# Patient Record
Sex: MALE | Race: WHITE | Employment: UNEMPLOYED | URBAN - METROPOLITAN AREA
[De-identification: names, ages, dates, MRNs, and addresses within clinical notes are randomized per-mention and may not be internally consistent; named-entity substitution may affect disease eponyms.]

---

## 2017-08-11 ENCOUNTER — INITIAL CONSULT (OUTPATIENT)
Dept: NEUROSURGERY | Age: 57
End: 2017-08-11
Payer: COMMERCIAL

## 2017-08-11 VITALS
SYSTOLIC BLOOD PRESSURE: 147 MMHG | HEIGHT: 72 IN | WEIGHT: 183 LBS | DIASTOLIC BLOOD PRESSURE: 86 MMHG | BODY MASS INDEX: 24.79 KG/M2 | HEART RATE: 71 BPM

## 2017-08-11 DIAGNOSIS — G89.29 CHRONIC NECK PAIN: Primary | ICD-10-CM

## 2017-08-11 DIAGNOSIS — G89.29 CHRONIC LOW BACK PAIN, UNSPECIFIED BACK PAIN LATERALITY, WITH SCIATICA PRESENCE UNSPECIFIED: ICD-10-CM

## 2017-08-11 DIAGNOSIS — M54.2 CHRONIC NECK PAIN: Primary | ICD-10-CM

## 2017-08-11 DIAGNOSIS — M54.5 CHRONIC LOW BACK PAIN, UNSPECIFIED BACK PAIN LATERALITY, WITH SCIATICA PRESENCE UNSPECIFIED: ICD-10-CM

## 2017-08-11 PROCEDURE — 99243 OFF/OP CNSLTJ NEW/EST LOW 30: CPT | Performed by: NEUROLOGICAL SURGERY

## 2017-08-25 ENCOUNTER — HOSPITAL ENCOUNTER (OUTPATIENT)
Dept: NUCLEAR MEDICINE | Age: 57
Discharge: HOME | End: 2017-08-25
Payer: COMMERCIAL

## 2017-08-25 ENCOUNTER — HOSPITAL ENCOUNTER (OUTPATIENT)
Dept: MRI IMAGING | Age: 57
Discharge: HOME | End: 2017-08-25
Payer: COMMERCIAL

## 2017-08-25 DIAGNOSIS — G89.29 CHRONIC LOW BACK PAIN, UNSPECIFIED BACK PAIN LATERALITY, WITH SCIATICA PRESENCE UNSPECIFIED: ICD-10-CM

## 2017-08-25 DIAGNOSIS — G89.29 CHRONIC NECK PAIN: ICD-10-CM

## 2017-08-25 DIAGNOSIS — M54.5 CHRONIC LOW BACK PAIN, UNSPECIFIED BACK PAIN LATERALITY, WITH SCIATICA PRESENCE UNSPECIFIED: ICD-10-CM

## 2017-08-25 DIAGNOSIS — M54.2 CHRONIC NECK PAIN: ICD-10-CM

## 2017-08-25 PROCEDURE — 3430000000 HC RX DIAGNOSTIC RADIOPHARMACEUTICAL: Performed by: NEUROLOGICAL SURGERY

## 2017-08-25 PROCEDURE — 72141 MRI NECK SPINE W/O DYE: CPT

## 2017-08-25 PROCEDURE — A9503 TC99M MEDRONATE: HCPCS | Performed by: NEUROLOGICAL SURGERY

## 2017-08-25 PROCEDURE — 78320 NM BONE SCAN SPECT: CPT

## 2017-08-25 RX ORDER — TC 99M MEDRONATE 20 MG/10ML
25 INJECTION, POWDER, LYOPHILIZED, FOR SOLUTION INTRAVENOUS
Status: COMPLETED | OUTPATIENT
Start: 2017-08-25 | End: 2017-08-25

## 2017-08-25 RX ADMIN — Medication 22 MILLICURIE: at 11:25

## 2017-09-05 ENCOUNTER — OFFICE VISIT (OUTPATIENT)
Dept: NEUROSURGERY | Age: 57
End: 2017-09-05
Payer: COMMERCIAL

## 2017-09-05 VITALS — HEART RATE: 64 BPM | HEIGHT: 72 IN | SYSTOLIC BLOOD PRESSURE: 184 MMHG | DIASTOLIC BLOOD PRESSURE: 86 MMHG

## 2017-09-05 DIAGNOSIS — M50.90 CERVICAL DISC DISEASE: Primary | ICD-10-CM

## 2017-09-05 DIAGNOSIS — M51.9 LUMBAR DISC DISEASE: ICD-10-CM

## 2017-09-05 PROCEDURE — 99212 OFFICE O/P EST SF 10 MIN: CPT | Performed by: NEUROLOGICAL SURGERY

## 2017-09-21 ENCOUNTER — TELEPHONE (OUTPATIENT)
Dept: NEUROSURGERY | Age: 57
End: 2017-09-21

## 2017-09-21 DIAGNOSIS — M51.9 LUMBAR DISC DISEASE: Primary | ICD-10-CM

## 2017-10-04 ENCOUNTER — TELEPHONE (OUTPATIENT)
Dept: NEUROSURGERY | Age: 57
End: 2017-10-04

## 2017-10-04 NOTE — TELEPHONE ENCOUNTER
Spoke with patient, another doctor is trying to get everything approved through [facility].

## 2017-10-04 NOTE — TELEPHONE ENCOUNTER
LM for patient, I received a call from [facility] to see if the pt was using his private insurance or [facility].       Yamila Brandon from [facility]

## 2017-12-01 ENCOUNTER — HOSPITAL ENCOUNTER (EMERGENCY)
Facility: CLINIC | Age: 57
Discharge: HOME | End: 2017-12-01
Attending: EMERGENCY MEDICINE
Payer: COMMERCIAL

## 2017-12-01 VITALS
HEART RATE: 62 BPM | TEMPERATURE: 97.9 F | DIASTOLIC BLOOD PRESSURE: 66 MMHG | SYSTOLIC BLOOD PRESSURE: 140 MMHG | RESPIRATION RATE: 13 BRPM | OXYGEN SATURATION: 96 % | BODY MASS INDEX: 25.06 KG/M2 | WEIGHT: 185 LBS | HEIGHT: 72 IN

## 2017-12-01 DIAGNOSIS — J01.91 ACUTE RECURRENT SINUSITIS, UNSPECIFIED LOCATION: Primary | ICD-10-CM

## 2017-12-01 PROCEDURE — 99282 EMERGENCY DEPT VISIT SF MDM: CPT

## 2017-12-01 RX ORDER — ACETAMINOPHEN AND CODEINE PHOSPHATE 120; 12 MG/5ML; MG/5ML
5-15 SOLUTION ORAL EVERY 6 HOURS PRN
Qty: 300 ML | Refills: 0 | Status: SHIPPED | OUTPATIENT
Start: 2017-12-01 | End: 2021-07-26

## 2017-12-01 RX ORDER — SULFAMETHOXAZOLE AND TRIMETHOPRIM 800; 160 MG/1; MG/1
1 TABLET ORAL 2 TIMES DAILY
Qty: 20 TABLET | Refills: 0 | Status: SHIPPED | OUTPATIENT
Start: 2017-12-01 | End: 2017-12-11

## 2017-12-01 ASSESSMENT — PAIN SCALES - GENERAL: PAINLEVEL_OUTOF10: 4

## 2017-12-01 ASSESSMENT — PAIN DESCRIPTION - LOCATION: LOCATION: FACE

## 2017-12-01 ASSESSMENT — PAIN DESCRIPTION - PAIN TYPE: TYPE: ACUTE PAIN

## 2017-12-01 NOTE — ED PROVIDER NOTES
Dispense:  20 tablet     Refill:  0    acetaminophen-codeine 120-12 MG/5ML solution     Sig: Take 5-15 mLs by mouth every 6 hours as needed for Pain (Cough) . Dispense:  300 mL     Refill:  0          FINAL IMPRESSION      1. Acute recurrent sinusitis, unspecified location          DISPOSITION/PLAN   DISPOSITION Decision to Discharge    Condition on Disposition  Good    PATIENT REFERRED TO:  Christian Khan MD  [address] [id number] [address]  [phone]    Schedule an appointment as soon as possible for a visit in 3 days        DISCHARGE MEDICATIONS:  New Prescriptions    ACETAMINOPHEN-CODEINE 120-12 MG/5ML SOLUTION    Take 5-15 mLs by mouth every 6 hours as needed for Pain (Cough) .     SULFAMETHOXAZOLE-TRIMETHOPRIM (BACTRIM DS) 800-160 MG PER TABLET    Take 1 tablet by mouth 2 times daily for 10 days       (Please note that portions of this note were completed with a voice recognition program.  Efforts were made to edit the dictations but occasionally words are mis-transcribed.)    Meng Barajas MD, F.A.C.E.P, F.A.A.E.M  Emergency Physician Attending          Meng Barajas MD  12/01/17 [id number]

## 2017-12-12 ENCOUNTER — OFFICE VISIT (OUTPATIENT)
Dept: FAMILY MEDICINE CLINIC | Age: 57
End: 2017-12-12
Payer: COMMERCIAL

## 2017-12-12 VITALS
RESPIRATION RATE: 16 BRPM | HEIGHT: 72 IN | TEMPERATURE: 97.9 F | OXYGEN SATURATION: 94 % | HEART RATE: 69 BPM | BODY MASS INDEX: 25.04 KG/M2 | WEIGHT: 184.9 LBS | DIASTOLIC BLOOD PRESSURE: 76 MMHG | SYSTOLIC BLOOD PRESSURE: 126 MMHG

## 2017-12-12 DIAGNOSIS — R09.82 PND (POST-NASAL DRIP): Primary | ICD-10-CM

## 2017-12-12 DIAGNOSIS — G44.309 HEADACHES DUE TO OLD HEAD TRAUMA: ICD-10-CM

## 2017-12-12 DIAGNOSIS — Z83.3 FAMILY HISTORY OF DIABETES MELLITUS: ICD-10-CM

## 2017-12-12 DIAGNOSIS — J32.9 CHRONIC CONGESTION OF PARANASAL SINUS: ICD-10-CM

## 2017-12-12 DIAGNOSIS — R41.3 MEMORY LOSS: ICD-10-CM

## 2017-12-12 DIAGNOSIS — Z00.00 PREVENTATIVE HEALTH CARE: ICD-10-CM

## 2017-12-12 DIAGNOSIS — Z12.11 COLON CANCER SCREENING: ICD-10-CM

## 2017-12-12 DIAGNOSIS — S09.90XS HEADACHES DUE TO OLD HEAD TRAUMA: ICD-10-CM

## 2017-12-12 PROCEDURE — 99214 OFFICE O/P EST MOD 30 MIN: CPT | Performed by: NURSE PRACTITIONER

## 2017-12-12 RX ORDER — LORATADINE AND PSEUDOEPHEDRINE 10; 240 MG/1; MG/1
1 TABLET, EXTENDED RELEASE ORAL DAILY
Qty: 30 TABLET | Refills: 1 | Status: SHIPPED | OUTPATIENT
Start: 2017-12-12

## 2017-12-12 RX ORDER — FLUTICASONE PROPIONATE 50 MCG
1 SPRAY, SUSPENSION (ML) NASAL DAILY
COMMUNITY

## 2017-12-12 ASSESSMENT — PATIENT HEALTH QUESTIONNAIRE - PHQ9
1. LITTLE INTEREST OR PLEASURE IN DOING THINGS: 0
SUM OF ALL RESPONSES TO PHQ QUESTIONS 1-9: 0
SUM OF ALL RESPONSES TO PHQ9 QUESTIONS 1 & 2: 0
2. FEELING DOWN, DEPRESSED OR HOPELESS: 0

## 2017-12-12 ASSESSMENT — ENCOUNTER SYMPTOMS
SORE THROAT: 0
EYE DISCHARGE: 0
ABDOMINAL PAIN: 0
EYE ITCHING: 0
RHINORRHEA: 0
COUGH: 0
SHORTNESS OF BREATH: 1
ABDOMINAL DISTENTION: 0
SINUS PRESSURE: 1
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
## 2017-12-12 NOTE — PROGRESS NOTES
Visit Information    Have you changed or started any medications since your last visit including any over-the-counter medicines, vitamins, or herbal medicines? no   Have you stopped taking any of your medications? Is so, why? -  no  Are you having any side effects from any of your medications? - no    Have you seen any other physician or provider since your last visit?  no   Have you had any other diagnostic tests since your last visit?  no   Have you been seen in the emergency room and/or had an admission in a hospital since we last saw you?  no   Have you had your routine dental cleaning in the past 6 months?  no     Do you have an active [employer] account? If no, what is the barrier?   No: letter printed for pt to sign up    Patient Care Team:  Jose J Rizo MD as PCP - General    Medical History Review  Past Medical, Family, and Social History reviewed and does contribute to the patient presenting condition    Health Maintenance   Topic Date Due    Hepatitis C screen  1960    HIV screen  02/17/1973    Lipid screen  02/17/1998    Diabetes screen  02/17/1998    Colon cancer screen colonoscopy  02/17/2008    Flu vaccine (1) 09/01/2017    DTaP/Tdap/Td vaccine (2 - Td) 07/09/2025

## 2017-12-29 ENCOUNTER — HOSPITAL ENCOUNTER (OUTPATIENT)
Dept: CT IMAGING | Age: 57
Discharge: HOME | End: 2017-12-29
Payer: COMMERCIAL

## 2017-12-29 DIAGNOSIS — R09.81 NASAL CONGESTION: ICD-10-CM

## 2017-12-29 DIAGNOSIS — J32.0 CHRONIC MAXILLARY SINUSITIS: ICD-10-CM

## 2017-12-29 PROCEDURE — 70486 CT MAXILLOFACIAL W/O DYE: CPT

## 2018-02-09 ENCOUNTER — TELEPHONE (OUTPATIENT)
Dept: FAMILY MEDICINE CLINIC | Age: 58
End: 2018-02-09

## 2018-04-09 ENCOUNTER — HOSPITAL ENCOUNTER (OUTPATIENT)
Age: 58
Discharge: HOME | End: 2018-04-09
Payer: COMMERCIAL

## 2018-04-09 ENCOUNTER — HOSPITAL ENCOUNTER (OUTPATIENT)
Dept: GENERAL RADIOLOGY | Age: 58
Discharge: HOME | End: 2018-04-11
Payer: COMMERCIAL

## 2018-04-09 ENCOUNTER — HOSPITAL ENCOUNTER (OUTPATIENT)
Age: 58
Discharge: HOME | End: 2018-04-11
Payer: COMMERCIAL

## 2018-04-09 DIAGNOSIS — Z01.811 ENCOUNTER FOR PREOPERATIVE PULMONARY EXAMINATION: ICD-10-CM

## 2018-04-09 DIAGNOSIS — J32.0 ANTRITIS CHRONIC: ICD-10-CM

## 2018-04-09 DIAGNOSIS — J30.9 ALLERGIC RHINITIS, UNSPECIFIED CHRONICITY, UNSPECIFIED SEASONALITY, UNSPECIFIED TRIGGER: ICD-10-CM

## 2018-04-09 DIAGNOSIS — J32.2 CHRONIC ANTERIOR ETHMOIDAL SINUSITIS: ICD-10-CM

## 2018-04-09 DIAGNOSIS — J34.2 ACQUIRED DEFLECTED NASAL SEPTUM: ICD-10-CM

## 2018-04-09 DIAGNOSIS — Z01.810 PRE-OPERATIVE CARDIOVASCULAR EXAMINATION: ICD-10-CM

## 2018-04-09 LAB
EKG ATRIAL RATE: 59 BPM
EKG P AXIS: 79 DEGREES
EKG P-R INTERVAL: 104 MS
EKG Q-T INTERVAL: 412 MS
EKG QRS DURATION: 112 MS
EKG QTC CALCULATION (BAZETT): 407 MS
EKG R AXIS: 69 DEGREES
EKG T AXIS: 20 DEGREES
EKG VENTRICULAR RATE: 59 BPM

## 2018-04-09 PROCEDURE — 71046 X-RAY EXAM CHEST 2 VIEWS: CPT

## 2018-04-09 PROCEDURE — 93005 ELECTROCARDIOGRAM TRACING: CPT

## 2018-05-03 ENCOUNTER — HOSPITAL ENCOUNTER (OUTPATIENT)
Age: 58
Setting detail: SPECIMEN
Discharge: HOME | End: 2018-05-03
Payer: COMMERCIAL

## 2018-05-07 LAB — SURGICAL PATHOLOGY REPORT: NORMAL

## 2018-05-08 LAB
CULTURE: ABNORMAL
DIRECT EXAM: ABNORMAL
DIRECT EXAM: ABNORMAL
Lab: ABNORMAL
SPECIMEN DESCRIPTION: ABNORMAL
STATUS: ABNORMAL

## 2021-05-24 ENCOUNTER — TELEPHONE (OUTPATIENT)
Dept: INTERNAL MEDICINE | Age: 61
End: 2021-05-24

## 2021-06-01 NOTE — TELEPHONE ENCOUNTER
Patient called the office back but Dr. Vicky Quispe schedule is completely full so the writer told the patient she will call him once the office gets Julys schedule.

## 2021-06-23 NOTE — TELEPHONE ENCOUNTER
Patient is scheduled for a Medication Management appointment on 7/26/21 at  9:00 am. The patient will be seen via Telephone visit.

## 2021-07-26 ENCOUNTER — TELEPHONE (OUTPATIENT)
Dept: INTERNAL MEDICINE | Age: 61
End: 2021-07-26

## 2021-07-26 ENCOUNTER — OFFICE VISIT (OUTPATIENT)
Dept: INTERNAL MEDICINE | Age: 61
End: 2021-07-26
Payer: COMMERCIAL

## 2021-07-26 DIAGNOSIS — G43.719 INTRACTABLE CHRONIC MIGRAINE WITHOUT AURA AND WITHOUT STATUS MIGRAINOSUS: Primary | ICD-10-CM

## 2021-07-26 PROCEDURE — 1111F DSCHRG MED/CURRENT MED MERGE: CPT | Performed by: INTERNAL MEDICINE

## 2021-07-26 PROCEDURE — 99999 PR OFFICE/OUTPT VISIT,PROCEDURE ONLY: CPT | Performed by: INTERNAL MEDICINE

## 2021-07-26 RX ORDER — ERENUMAB-AOOE 140 MG/ML
140 INJECTION, SOLUTION SUBCUTANEOUS
Qty: 1 ML | Refills: 5 | Status: SHIPPED | OUTPATIENT
Start: 2021-07-26 | End: 2021-12-06 | Stop reason: SDUPTHER

## 2021-07-26 RX ORDER — VENLAFAXINE HYDROCHLORIDE 37.5 MG/1
1 CAPSULE, EXTENDED RELEASE ORAL DAILY
COMMUNITY
Start: 2021-06-08 | End: 2021-12-06

## 2021-07-26 RX ORDER — BUDESONIDE AND FORMOTEROL FUMARATE DIHYDRATE 160; 4.5 UG/1; UG/1
2 AEROSOL RESPIRATORY (INHALATION) 2 TIMES DAILY
COMMUNITY
Start: 2021-05-11

## 2021-07-26 RX ORDER — ERENUMAB-AOOE 140 MG/ML
140 INJECTION, SOLUTION SUBCUTANEOUS
COMMUNITY
Start: 2021-06-04 | End: 2021-07-26 | Stop reason: SDUPTHER

## 2021-07-26 ASSESSMENT — PATIENT HEALTH QUESTIONNAIRE - PHQ9: DEPRESSION UNABLE TO ASSESS: URGENT/EMERGENT SITUATION

## 2021-07-26 NOTE — PATIENT INSTRUCTIONS
Medications e-scribe to pharmacy of pt's choice. Patient was put on a wait list and will be contacted to schedule their next follow up appointment once the schedule is available. If the patient is in need of an appointment before their next visit please call the office at [phone]. After Visit Summary  mailed to patient.     MINAL

## 2021-07-26 NOTE — PROGRESS NOTES
Initial Specialty Medication Virtual Visit  [facility]  [facility] [address] [address]  Dept: [phone]  Dept Fax: [phone]  Date of patient's visit: 7/26/2021  Patient's Name:  Shon Collet YOB: 1960            Patient Care Team:  ANUPAM Story CNP as PCP - General (Certified Nurse Practitioner)  ================================================================    REASON FOR VISIT/CHIEF COMPLAINT:  Medication Management (New Patient, Aimovig)    HISTORY OF PRESENTING ILLNESS:  Shon Collet is 59 y.o. is here for initial virtual visit for specialty medication. Patient has been on Aimovig for the last 6 months or so. He is doing well. He says it has cut down the number of headaches and he only gets a headache now about twice a month. No questions or concerns with the medication. No side effects or reactions. He follows up with his neurologist every 2 months. No problems with injection site reactions. Specialty Medication: Aimovig 140 mg/ml  Frequency: q month  Indication: migraines  Initially Diagnosed: 2018  Specialist: Susan Covington  Last Specialist Visit: every 2 months  Side effects includes: None   Current symptoms include: headaches 2/month  Shon Collet has no new complain today. Recent blood work done on N/A .         DIAGNOSTIC FINDINGS:  CBC:No results found for: WBC, HGB, PLT    BMP:  No results found for: NA, K, CL, CO2, BUN, CREATININE, GLUCOSE    HEMOGLOBIN A1C: No results found for: LABA1C    FASTING LIPID PANEL:No results found for: CHOL, HDL, TRIG    No results found for: EDGARDO    No results found for: HAV, HEPAIGM, HEPBIGM, HEPBCAB, HBEAG, HEPCAB        Patient Active Problem List   Diagnosis    Memory loss    Headaches due to old head trauma    PND (post-nasal drip)    Chronic congestion of paranasal sinus       Health Maintenance Due   Topic Date Due    Hepatitis C screen  Never done    HIV screen Never done    Lipid screen  Never done    Shingles Vaccine (1 of 2) Never done       No Known Allergies      Current Outpatient Medications   Medication Sig Dispense Refill    Erenumab-aooe (AIMOVIG) 140 MG/ML SOAJ Inject 140 mg into the skin every 30 days 1 mL 5    budesonide-formoterol (SYMBICORT) 160-4.5 MCG/ACT AERO Inhale 2 puffs into the lungs 2 times daily      venlafaxine (EFFEXOR XR) 37.5 MG extended release capsule Take 1 capsule by mouth daily      fluticasone (FLONASE ALLERGY RELIEF) 50 MCG/ACT nasal spray 1 spray by Nasal route daily      loratadine-pseudoephedrine (CLARITIN-D 24 HOUR)  MG per extended release tablet Take 1 tablet by mouth daily 30 tablet 1    albuterol sulfate  (90 BASE) MCG/ACT inhaler Inhale 2 puffs into the lungs every 6 hours as needed for Wheezing      ibuprofen (ADVIL;MOTRIN) 800 MG tablet Take 1 tablet by mouth every 8 hours as needed for Pain (Patient taking differently: Take 800 mg by mouth 2 times daily ) 30 tablet 0     No current facility-administered medications for this visit. Social History     Tobacco Use    Smoking status: Former Smoker     Packs/day: 1.00     Years: 20.00     Pack years: 20.00    Smokeless tobacco: Never Used   Substance Use Topics    Alcohol use: Yes     Alcohol/week: 2.0 standard drinks     Types: 2 Cans of beer per week     Comment: prev daily drinker of \"a six pack\" quit 2016     Drug use: No       Family History   Problem Relation Age of Onset    Hypertension Mother     Coronary Art Dis Mother     Diabetes Mother     Coronary Art Dis Father     Diabetes Father         REVIEW OF SYSTEMS:  Review of Systems    PHYSICAL EXAM:  There were no vitals filed for this visit. BP Readings from Last 3 Encounters:   12/12/17 126/76   12/01/17 (!) 140/66   09/05/17 (!) 184/86          ASSESSMENT AND PLAN:  Kam Branch was seen today for medication management.     Diagnoses and all orders for this visit:    Chronic migraine  - Erenumab-aooe (AIMOVIG) 140 MG/ML SOAJ; Inject 140 mg into the skin every 30 days  -     [facility]) Specialty Medication Service      FOLLOW UP AND INSTRUCTIONS:  · Follow up with 6 months    · Madison David received counseling on the following healthy behaviors: nutrition, exercise and medication adherence    · Discussed use, benefit, and side effects of prescribed medications. Barriers to medication compliance addressed. All patient questions answered. Pt voiced understanding.       Marita Sanchez  Director [facility] pharmacy program  Faculty Internal Medicine    [facility]  [address] [place] [address]    7/26/2021, 9:03 AM

## 2021-07-26 NOTE — TELEPHONE ENCOUNTER
Medication Management Service    Date of Chart Review: 7/26/2021  Patient's Name: Wily Locke YOB: 1960            ______________________________________________________________________    Reason for visit: Pharmacy chart review completed prior to initial Specialty Medication Service physician visit. Patient is prescribed a [employer] formulary medication, Aimovig. Medication list updated. Specialty Medication: AIMOVIG 140MG/ML SOAJ   Frequency: EVERY MONTH   Indication: MIGRAINE   Initially Diagnosed: 2018  Additional Therapy:   · Venlafaxine ER 37.5mg  · Ibuprofen?    Previous Therapy:   · APAP   · Divalproed ER 500mg     Specialist:   Cory Kam MD  [facility]   [address] [place] [address]  [phone]  Specialist Progress Note Available: No, will request   Last Specialist Visit: Unknown; likely ~2/18/2021 based on written date of Rx    ______________________________________________________________________  Medical History Survey:  Specialty Medication Start Date: 4/2020 (first attempted fill)   Appropriate Dose: Yes  ______________________________________________________________________  Current Medications and Allergies:    No Known Allergies      Current Outpatient Medications   Medication Sig Dispense Refill    budesonide-formoterol (SYMBICORT) 160-4.5 MCG/ACT AERO Inhale 2 puffs into the lungs 2 times daily      venlafaxine (EFFEXOR XR) 37.5 MG extended release capsule Take 1 capsule by mouth daily      AIMOVIG 140 MG/ML SOAJ Inject 140 mg into the skin every 30 days      fluticasone (FLONASE ALLERGY RELIEF) 50 MCG/ACT nasal spray 1 spray by Nasal route daily      loratadine-pseudoephedrine (CLARITIN-D 24 HOUR)  MG per extended release tablet Take 1 tablet by mouth daily 30 tablet 1    albuterol sulfate  (90 BASE) MCG/ACT inhaler Inhale 2 puffs into the lungs every 6 hours as needed for Wheezing      ibuprofen (ADVIL;MOTRIN) 800 MG tablet Take 1 tablet by mouth every 8 hours as needed for Pain (Patient taking differently: Take 800 mg by mouth 2 times daily ) 30 tablet 0     No current facility-administered medications for this visit.     _____________________________________________________________________  Drug Interactions:  No clinically significant interactions identified via Livestream Interaction Analysis as category D or higher.  _____________________________________________________________________  Renal Dosing:  Creatinine Clearance: CrCl cannot be calculated (No successful lab value found. ). No renal adjustments necessary. for medications   _____________________________________________________________________  Labs:  CBC: No results found for: WBC, HGB, PLT    BMP: No results found for: NA, K, CL, CO2, BUN, CREATININE, GLUCOSE    FASTING LIPID PANEL: No results found for: CHOL, HDL, TRIG    LFTS: No results found for: AST, ALT, ALB, BILITOT, ALKPHOS     ______________________________________________________________________  Assessment/Plan:  Patient prescribed [facility] formulary medication, Kasey Rose. Chart review suggest patient has been on this therapy since ~4/2020 based on first fill attempt. He has likely been filling using a copay card that allows for 12 month trial without a completed prior authorization. Limited notes to review but based on available notes, no clinically significant drug/drug interactions identified. Patient would benefit from Shingrix vaccination based on age if not already completed. No recommendation with current therapy identified. Erenumab (Aimovig)  ADR Monitoring: Hypersensitivity reactions and Constipation including cases with serious complications resulting in hospitalization and surgery, has been reported. Constipation has generally occurred after the first dose; however, a later onset has also been observed.  Concurrent use of medications that decrease GI motility may increase the risk for more severe constipation and the potential for constipation-related complications. Lab Monitoring: None recommended at this time. Instructed patient to keep tract of number of monthly migraine days to access efficacy  Storage: Refrigerate pre-filled syringe at 2°C to 8°C (36°F to 46°F). Do not freeze. Do not shake. Handling: Keep in original carton to protect from light until time of use. If removed from fridge, keep at room temperature in the original carton. (Must be used within 7 days). Do not use beyond date on carton if refrigerated or beyond 7 days if out of refrigerator.    Prior to SQ administration, remove from refrigerator and allow to sit at room temperature, outside of carton for at least 30 minutes (protect from direct sunlight) - do not warm by using other heat source (e.x hot water or microwave)  _____________________________________________________________________  Next Follow-Up:    [facility] - 7/26/2021   Neurology - Unknown     Cyndi Pearce, [facility], PharmD, BCPS  Ambulatory Clinical Pharmacist   7/26/2021 7:57 AM

## 2021-11-30 ENCOUNTER — TELEPHONE (OUTPATIENT)
Dept: INTERNAL MEDICINE | Age: 61
End: 2021-11-30

## 2021-12-01 NOTE — TELEPHONE ENCOUNTER
SMS f/u scheduled for 12/6. Please let me know if you have not received office notes for this patient.

## 2021-12-06 ENCOUNTER — VIRTUAL VISIT (OUTPATIENT)
Dept: INTERNAL MEDICINE | Age: 61
End: 2021-12-06

## 2021-12-06 DIAGNOSIS — G43.719 INTRACTABLE CHRONIC MIGRAINE WITHOUT AURA AND WITHOUT STATUS MIGRAINOSUS: Primary | ICD-10-CM

## 2021-12-06 PROCEDURE — 99999 PR OFFICE/OUTPT VISIT,PROCEDURE ONLY: CPT | Performed by: PHARMACIST

## 2021-12-06 RX ORDER — VENLAFAXINE HYDROCHLORIDE 75 MG/1
1 CAPSULE, EXTENDED RELEASE ORAL DAILY
COMMUNITY
Start: 2021-09-09

## 2021-12-06 RX ORDER — ERENUMAB-AOOE 140 MG/ML
140 INJECTION, SOLUTION SUBCUTANEOUS
Qty: 1 ML | Refills: 12 | Status: SHIPPED | OUTPATIENT
Start: 2021-12-06

## 2021-12-06 ASSESSMENT — PROMIS GLOBAL HEALTH SCALE
IN GENERAL, WOULD YOU SAY YOUR HEALTH IS...[ON A SCALE OF 1 (POOR) TO 5 (EXCELLENT)]: 3
IN GENERAL, HOW WOULD YOU RATE YOUR MENTAL HEALTH, INCLUDING YOUR MOOD AND YOUR ABILITY TO THINK [ON A SCALE OF 1 (POOR) TO 5 (EXCELLENT)]?: 2
WHO IS THE PERSON COMPLETING THE PROMIS V1.1 SURVEY?: 0
TO WHAT EXTENT ARE YOU ABLE TO CARRY OUT YOUR EVERYDAY PHYSICAL ACTIVITIES SUCH AS WALKING, CLIMBING STAIRS, CARRYING GROCERIES, OR MOVING A CHAIR [ON A SCALE OF 1 (NOT AT ALL) TO 5 (COMPLETELY)]?: 4
IN THE PAST 7 DAYS, HOW WOULD YOU RATE YOUR FATIGUE ON AVERAGE [ON A SCALE FROM 1 (NONE) TO 5 (VERY SEVERE)]?: 3
SUM OF RESPONSES TO QUESTIONS 2, 4, 5, & 10: 10
IN GENERAL, WOULD YOU SAY YOUR QUALITY OF LIFE IS...[ON A SCALE OF 1 (POOR) TO 5 (EXCELLENT)]: 3
IN THE PAST 7 DAYS, HOW OFTEN HAVE YOU BEEN BOTHERED BY EMOTIONAL PROBLEMS, SUCH AS FEELING ANXIOUS, DEPRESSED, OR IRRITABLE [ON A SCALE FROM 1 (NEVER) TO 5 (ALWAYS)]?: 2
HOW IS THE PROMIS V1.1 BEING ADMINISTERED?: 1
IN THE PAST 7 DAYS, HOW WOULD YOU RATE YOUR PAIN ON AVERAGE [ON A SCALE FROM 0 (NO PAIN) TO 10 (WORST IMAGINABLE PAIN)]?: 3
SUM OF RESPONSES TO QUESTIONS 3, 6, 7, & 8: 13
IN GENERAL, HOW WOULD YOU RATE YOUR PHYSICAL HEALTH [ON A SCALE OF 1 (POOR) TO 5 (EXCELLENT)]?: 3
IN GENERAL, PLEASE RATE HOW WELL YOU CARRY OUT YOUR USUAL SOCIAL ACTIVITIES (INCLUDES ACTIVITIES AT HOME, AT WORK, AND IN YOUR COMMUNITY, AND RESPONSIBILITIES AS A PARENT, CHILD, SPOUSE, EMPLOYEE, FRIEND, ETC) [ON A SCALE OF 1 (POOR) TO 5 (EXCELLENT)]?: 3
IN GENERAL, HOW WOULD YOU RATE YOUR SATISFACTION WITH YOUR SOCIAL ACTIVITIES AND RELATIONSHIPS [ON A SCALE OF 1 (POOR) TO 5 (EXCELLENT)]?: 3
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
## 2022-05-16 ENCOUNTER — TELEPHONE (OUTPATIENT)
Dept: INTERNAL MEDICINE | Age: 62
End: 2022-05-16

## 2022-05-16 NOTE — TELEPHONE ENCOUNTER
PC from patient to schedule med management appt. I was not sure if it was to be scheduled with Dr Gisela Lance or Bard Sherman, Please contact patient to schedule appt.

## 2022-05-16 NOTE — TELEPHONE ENCOUNTER
Updated patient's medication list and allergies per [facility] SERV accreditation policy. Shane Gonzalez, PharmD, [address]  Ambulatory Clinical Pharmacist   Specialty Medication Service  Phone: [phone]    For Pharmacy [address] in place:   Yes   Time Spent (min): 20

## 2022-11-01 ENCOUNTER — TELEPHONE (OUTPATIENT)
Dept: INTERNAL MEDICINE | Age: 62
End: 2022-11-01

## 2022-11-01 NOTE — TELEPHONE ENCOUNTER
Specialty Medication Service    Date: 11/1/2022  Patient's Name: Sidra Adam YOB: 1960            _____________________________________________________________________________________________    Left message to schedule PharmD follow up appointment for Specialty Medication Services. Please call: [phone] option 4. Will continue to outreach as appropriate.     Shi PatelD [facility]  Ambulatory Clinical Pharmacist  Specialty Medication Services  Phone: [phone]  Fax: [phone]

## 2022-11-10 ENCOUNTER — TELEPHONE (OUTPATIENT)
Dept: INTERNAL MEDICINE | Age: 62
End: 2022-11-10

## 2022-11-10 NOTE — TELEPHONE ENCOUNTER
Specialty Medication Service    Date: 11/10/2022  Patient's Name: France Russell YOB: 1960            _____________________________________________________________________________________________    Nilo [place] to Dr. Kiley Sotelo office staff  to request office notes/labs for PharmD follow up appointment for Specialty Medication Services. They will fax over.       Janeth Haji, PharmD [facility]  Ambulatory Clinical Pharmacist  Specialty Medication Services  Phone: [phone]  Fax: [phone]    For Pharmacy [address] [facility] in place:  Yes  Recommendation Provided To: Provider: 1 via Called provider office  Intervention Accepted By: Provider: 1  Time Spent (min): 15
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
## 2022-11-16 ENCOUNTER — TELEPHONE (OUTPATIENT)
Dept: INTERNAL MEDICINE | Age: 62
End: 2022-11-16

## 2022-11-16 ENCOUNTER — PHARMACY VISIT (OUTPATIENT)
Dept: INTERNAL MEDICINE | Age: 62
End: 2022-11-16

## 2022-11-16 DIAGNOSIS — G43.719 INTRACTABLE CHRONIC MIGRAINE WITHOUT AURA AND WITHOUT STATUS MIGRAINOSUS: ICD-10-CM

## 2022-11-16 RX ORDER — ERENUMAB-AOOE 140 MG/ML
140 INJECTION, SOLUTION SUBCUTANEOUS
Qty: 3 ML | Refills: 1 | Status: SHIPPED | OUTPATIENT
Start: 2022-11-16

## 2022-11-16 ASSESSMENT — PROMIS GLOBAL HEALTH SCALE
SUM OF RESPONSES TO QUESTIONS 3, 6, 7, & 8: 13
IN THE PAST 7 DAYS, HOW OFTEN HAVE YOU BEEN BOTHERED BY EMOTIONAL PROBLEMS, SUCH AS FEELING ANXIOUS, DEPRESSED, OR IRRITABLE [ON A SCALE FROM 1 (NEVER) TO 5 (ALWAYS)]?: 2
TO WHAT EXTENT ARE YOU ABLE TO CARRY OUT YOUR EVERYDAY PHYSICAL ACTIVITIES SUCH AS WALKING, CLIMBING STAIRS, CARRYING GROCERIES, OR MOVING A CHAIR [ON A SCALE OF 1 (NOT AT ALL) TO 5 (COMPLETELY)]?: 4
IN THE PAST 7 DAYS, HOW WOULD YOU RATE YOUR FATIGUE ON AVERAGE [ON A SCALE FROM 1 (NONE) TO 5 (VERY SEVERE)]?: 3
IN GENERAL, HOW WOULD YOU RATE YOUR PHYSICAL HEALTH [ON A SCALE OF 1 (POOR) TO 5 (EXCELLENT)]?: 3
IN GENERAL, HOW WOULD YOU RATE YOUR MENTAL HEALTH, INCLUDING YOUR MOOD AND YOUR ABILITY TO THINK [ON A SCALE OF 1 (POOR) TO 5 (EXCELLENT)]?: 3
IN GENERAL, HOW WOULD YOU RATE YOUR SATISFACTION WITH YOUR SOCIAL ACTIVITIES AND RELATIONSHIPS [ON A SCALE OF 1 (POOR) TO 5 (EXCELLENT)]?: 3
IN GENERAL, PLEASE RATE HOW WELL YOU CARRY OUT YOUR USUAL SOCIAL ACTIVITIES (INCLUDES ACTIVITIES AT HOME, AT WORK, AND IN YOUR COMMUNITY, AND RESPONSIBILITIES AS A PARENT, CHILD, SPOUSE, EMPLOYEE, FRIEND, ETC) [ON A SCALE OF 1 (POOR) TO 5 (EXCELLENT)]?: 3
IN GENERAL, WOULD YOU SAY YOUR HEALTH IS...[ON A SCALE OF 1 (POOR) TO 5 (EXCELLENT)]: 3
IN GENERAL, WOULD YOU SAY YOUR QUALITY OF LIFE IS...[ON A SCALE OF 1 (POOR) TO 5 (EXCELLENT)]: 3
SUM OF RESPONSES TO QUESTIONS 2, 4, 5, & 10: 11
IN THE PAST 7 DAYS, HOW WOULD YOU RATE YOUR PAIN ON AVERAGE [ON A SCALE FROM 0 (NO PAIN) TO 10 (WORST IMAGINABLE PAIN)]?: 3

## 2022-11-16 NOTE — TELEPHONE ENCOUNTER
Specialty Medication Service    Date: 11/16/2022  Patient's Name: Michelle Cowan YOB: 1960            _____________________________________________________________________________________________    Abhay Oiler to Dr. Nevaeh Rogers office staff  to recommend an abortive/rescue medication for the patient's migraines. The staff seemed hesitant to ask Dr. Serina Toussaint to provide a prescription. I informed the office that Yeison Wilson has an appointment with them soon and they said he should discuss it with Dr. Serina Toussaint at that appointment. I have informed the patient to have that discussion at his next visit.     Mohan Moody, PharmD [facility]  Ambulatory Clinical Pharmacist  Specialty Medication Services  Phone: [phone]  Fax: [phone]    For Pharmacy [address] [place] [address] in place:  Yes  Recommendation Provided To: Provider: 1 via Called provider office  Intervention Accepted By: Provider: 0  Time Spent (min): 15

## 2023-05-15 ENCOUNTER — TELEPHONE (OUTPATIENT)
Dept: INTERNAL MEDICINE | Age: 63
End: 2023-05-15

## 2023-05-15 DIAGNOSIS — G43.719 INTRACTABLE CHRONIC MIGRAINE WITHOUT AURA AND WITHOUT STATUS MIGRAINOSUS: ICD-10-CM

## 2023-05-15 RX ORDER — ERENUMAB-AOOE 140 MG/ML
INJECTION, SOLUTION SUBCUTANEOUS
Qty: 3 ML | Refills: 1 | OUTPATIENT
Start: 2023-05-15
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
## 2023-05-17 NOTE — TELEPHONE ENCOUNTER
Specialty Medication Service    Date: 5/17/2023  Patient's Name: Scotty Sebastian YOB: 1960            _____________________________________________________________________________________________    Reached patient to schedule PharmD follow up appointment for Specialty Medication Services. Patient scheduled for 5/19/2023. Notes requested from specialist office. Michelle Epps, with office states all requests for notes should be faxed to [phone].     Sidney Zamora [facility]  Clinical    Specialty Medication Service   [phone] option 4   Fax: [phone]    For Pharmacy Admin Tracking Only    Program: [facility]  CPA in place:  No  Recommendation Provided To: Provider: 1 via Called provider office and Fax sent to office and Patient/Caregiver: 1 via Telephone  Intervention Detail: Scheduled Appointment  Intervention Accepted By: Provider: 0 and Patient/Caregiver: 1  Time Spent (min): 20

## 2023-05-17 NOTE — TELEPHONE ENCOUNTER
Specialty Medication Service    Date: 5/17/2023  Patient's Name: Ben Walters YOB: 1960            _____________________________________________________________________________________________    Left message to schedule PharmD follow up appointment for Specialty Medication Services. Please call: [phone] option 4. Will continue to outreach as appropriate.     Shi BanksD [facility]  Ambulatory Clinical Pharmacist  Specialty Medication Services  Phone: [phone]  Fax: [phone]

## 2023-05-19 ENCOUNTER — PHARMACY VISIT (OUTPATIENT)
Dept: INTERNAL MEDICINE | Age: 63
End: 2023-05-19

## 2023-05-19 DIAGNOSIS — G43.719 INTRACTABLE CHRONIC MIGRAINE WITHOUT AURA AND WITHOUT STATUS MIGRAINOSUS: Primary | ICD-10-CM

## 2023-05-19 RX ORDER — UBROGEPANT 100 MG/1
100 TABLET ORAL DAILY PRN
COMMUNITY

## 2023-05-19 RX ORDER — ACETAMINOPHEN 325 MG/1
650 TABLET ORAL EVERY 6 HOURS PRN
COMMUNITY

## 2023-05-25 ENCOUNTER — PHARMACY VISIT (OUTPATIENT)
Dept: INTERNAL MEDICINE | Age: 63
End: 2023-05-25

## 2023-05-25 DIAGNOSIS — G43.719 INTRACTABLE CHRONIC MIGRAINE WITHOUT AURA AND WITHOUT STATUS MIGRAINOSUS: Primary | ICD-10-CM

## 2023-05-25 PROCEDURE — 99999 PR OFFICE/OUTPT VISIT,PROCEDURE ONLY: CPT | Performed by: INTERNAL MEDICINE

## 2023-05-25 PROCEDURE — 1111F DSCHRG MED/CURRENT MED MERGE: CPT | Performed by: INTERNAL MEDICINE

## 2023-05-25 NOTE — PROGRESS NOTES
Specialty Medication Follow up Virtual Visit  [address] [address]  Dept: [phone]  Dept Fax: [phone]  Date of patient's visit: 5/25/2023  Patient's Name:  Elsa Cordero YOB: 1960            Patient Care Team:  ANUPAM Denson CNP as PCP - General (Certified Nurse Practitioner)  Dayna Mccormack MD as PCP - Empaneled Provider  ================================================================    REASON FOR VISIT/CHIEF COMPLAINT:  Medication Management    HISTORY OF PRESENTING ILLNESS:  History was obtained from: patient. Elsa Cordero is 70 y.o. is here for a follow up virtual visit for specialty medication. Specialty Medication: Aimovig 140 mgml  Frequency: every 30 days  Indication: migraine  Initially Diagnosed:   Specialist:   Last Specialist Visit: 2023  Side effects includes: none   Last visit with me was: 2021   Elsa Cordero has no new complain today. DIAGNOSTIC FINDINGS:  CBC:No results found for: WBC, HGB, PLT    BMP:  No results found for: NA, K, CL, CO2, BUN, CREATININE, GLUCOSE    HEMOGLOBIN A1C: No results found for: LABA1C    FASTING LIPID PANEL:No results found for: CHOL, HDL, TRIG      REVIEW OF SYSTEMS:  Review of Systems    PHYSICAL EXAM:  There were no vitals filed for this visit. BP Readings from Last 3 Encounters:   12/12/17 126/76   12/01/17 (!) 140/66   09/05/17 (!) 184/86          ASSESSMENT AND PLAN:  Diagnoses and all orders for this visit:    Intractable chronic migraine without aura and without status migrainosus  -     [facility]) Specialty Medication Service      FOLLOW UP AND INSTRUCTIONS:  Follow up with in 12 months. Qing Youngblood received counseling on the following healthy behaviors: nutrition, exercise, and medication adherence    Discussed use, benefit, and side effects of prescribed medications. Barriers to medication compliance addressed.   All patient

## 2023-06-05 DIAGNOSIS — G43.719 INTRACTABLE CHRONIC MIGRAINE WITHOUT AURA AND WITHOUT STATUS MIGRAINOSUS: ICD-10-CM

## 2023-06-05 RX ORDER — ERENUMAB-AOOE 140 MG/ML
140 INJECTION, SOLUTION SUBCUTANEOUS
Qty: 3 ML | Refills: 1 | Status: SHIPPED | OUTPATIENT
Start: 2023-06-05

## 2023-06-05 NOTE — TELEPHONE ENCOUNTER
Specialty Medication Service    Date: 6/5/2023  Patient's Name: Roslyn Prima YOB: 1960            _____________________________________________________________________________________________    Date: 6/5/2023  Patient's Name: Roslyn Prima    YOB: 1960            Refill request received from  [facility] for [address]. Patient last seen by [facility] 5/19/2023. CPA on file, will send new SMS prescription.        Rishi Bryan, PharmD [facility]  Ambulatory Clinical Pharmacist  Specialty Medication Services  Phone: [phone]  Fax: [phone]    For Pharmacy Admin Tracking Only    Program: SMS  CPA in place:  Yes  Recommendation Provided To: Patient/Caregiver: 1 via Telephone  Intervention Detail: Refill(s) Provided  Intervention Accepted By: Patient/Caregiver: 1  Time Spent (min): 15

## 2023-06-05 NOTE — TELEPHONE ENCOUNTER
Specialty Medication Service    Date: 6/5/2023  Patient's Name: Sangeetha Moulton YOB: 1960            _____________________________________________________________________________________________    Sangeetha Moulton is a 70 y.o. male enrolled in the Specialty Medication Service. We received a refill request for Aimovig on 6/5/2023. Original Rx was written for 3+4 fills on 5/16/2023.      Thank you,    Kennedy Martin      Requested Prescriptions     Pending Prescriptions Disp Refills    Erenumab-aooe (AIMOVIG) 140 MG/ML SOAJ 3 mL 4     Sig: Inject 140 mg into the skin every 30 days

## 2023-07-21 ENCOUNTER — TELEPHONE (OUTPATIENT)
Dept: INTERNAL MEDICINE | Age: 63
End: 2023-07-21

## 2023-07-21 NOTE — TELEPHONE ENCOUNTER
Specialty Medication Service    Date: 7/21/2023  Patient's Name: Inocente Hernandes YOB: 1960            _____________________________________________________________________________________________    Left message to schedule PharmD follow up appointment for therapy change for Specialty Medication Services. Please call: [phone] option 4. Will continue to outreach as appropriate.     Ana Buck PharmD [facility]  Ambulatory Clinical Pharmacist  Specialty Medication Services  Phone: [phone]  Fax: [phone]

## 2023-07-24 ENCOUNTER — TELEPHONE (OUTPATIENT)
Dept: INTERNAL MEDICINE | Age: 63
End: 2023-07-24

## 2023-07-24 NOTE — PROGRESS NOTES
no no     PROMIS V1.1 Global Health 11/16/2022 12/6/2021   In general, would you say your health is: 3 3   In general, would you say your quality of life is: 3 3   In general, how would you rate your physical health? 3 3   In general, how would you rate your mental health, including your mood and your ability to think? 3 2   In general, how would you rate your satisfaction with your social activities and relationships? 3 3   In general, please rate how well you carry out your usual social activities and roles. (This includes activities at home, at work and in your community, and responsibilities as a parent, child, spouse, employee, friend, etc.) 3 3   To what extent are you able to carry out your everyday physical activities such as walking, climbing stairs, carrying groceries, or moving a chair? 4 4   In the past 7 days how often have you been bothered by emotional problems such as feeling anxious, depressed or irritable? 2 2   In the past 7 days how would you rate your fatigue on average? 3 3   In the past 7 days how would you rate your pain on average? 3 3   Mode of Collection - 1   Person Completing Survey - 0   PROMIS Physical Score 13 13   PROMIS Mental Score 11 10       Migraine Headache  Anna Che is a 70 y.o. male being treated for Migraines. Medication Reconciliation completed with the patient, no drug-drug interactions identified. Allergy and diagnosis info reviewed and updated. Anna Che has a history remarkable for the following conditions: Migraines, COPD  Current therapy includes: Emgality 120mg once monthly, acetaminophen prn, ibuprofen prn, Ubrelvy 100mg prn  Medication Effectiveness: Patient disease is moderately well controlled on current therapy and switching from [facility] to [facility]. Patient had no questions regarding the medication's warnings, precautions, and contraindications.  Confirmed appropriate storage and disposal.  Patient had no concerns with the administration

## 2023-07-24 NOTE — TELEPHONE ENCOUNTER
Specialty Medication Service    Date: 7/24/2023  Patient's Name: Isaac Bowen YOB: 1960            _____________________________________________________________________________________________    Mario Farah patient's specialist office to request most recent office visit summary and relevant labs for Specialty Medication Service formulary medication. Talked to representative in specialist's office to have faxed to [phone].      Berta Romeo PharmD [facility]  Ambulatory Clinical Pharmacist  Specialty Medication Services  Phone: [phone]  Fax: [phone]    For Pharmacy Admin Tracking Only    Program: SMS  CPA in place:  Yes  Recommendation Provided To: Provider: 1 via Called provider office  Intervention Accepted By: Provider: 1  Time Spent (min): 15

## 2023-07-24 NOTE — TELEPHONE ENCOUNTER
Specialty Medication Service    Date: 7/24/2023  Patient's Name: Rafael Bartlett YOB: 1960            _____________________________________________________________________________________________    Reached patient to schedule PharmD follow up appointment for therapy change for Specialty Medication Services. Patient scheduled 7/27/2023.     Thee Valentin PharmD [facility]  Ambulatory Clinical Pharmacist  Specialty Medication Services  Phone: [phone]  Fax: [phone]    For Pharmacy Admin Tracking Only    Program: SMS  CPA in place:  Yes  Recommendation Provided To: Patient/Caregiver: 1 via Telephone  Intervention Detail: Scheduled Appointment  Intervention Accepted By: Patient/Caregiver: 1  Time Spent (min): 15

## 2023-07-24 NOTE — TELEPHONE ENCOUNTER
Specialty Medication Service    Date: 7/24/2023  Patient's Name: Rita Mcbride YOB: 1960            _____________________________________________________________________________________________    Office notes received and scanned into media.      Jim Castorena, PharmD [facility]  Ambulatory Clinical Pharmacist  Specialty Medication Services  Phone: [phone] option #4  Fax: [phone]    For Pharmacy Admin Tracking Only    Program: SMS  CPA in place:  Yes  Recommendation Provided To: Provider: 1 via Called provider office    Intervention Accepted By: Provider: 1    Time Spent (min): 15

## 2023-07-27 ENCOUNTER — PHARMACY VISIT (OUTPATIENT)
Dept: INTERNAL MEDICINE | Age: 63
End: 2023-07-27

## 2023-07-27 ENCOUNTER — TELEPHONE (OUTPATIENT)
Dept: INTERNAL MEDICINE | Age: 63
End: 2023-07-27

## 2023-07-27 DIAGNOSIS — G43.719 INTRACTABLE CHRONIC MIGRAINE WITHOUT AURA AND WITHOUT STATUS MIGRAINOSUS: Primary | ICD-10-CM

## 2023-07-27 PROCEDURE — 99999 PR OFFICE/OUTPT VISIT,PROCEDURE ONLY: CPT | Performed by: PHARMACIST

## 2023-07-27 NOTE — TELEPHONE ENCOUNTER
Specialty Medication Service    Date: 7/27/2023  Patient's Name: Zi Tam YOB: 1960            _____________________________________________________________________________________________    Patient has government secondary insurance and is ineligible for the SMS program. Patient has been informed. Patient is no longer enrolled in SMS program. No further outreach planned at this time.     Jennifer Obregon, PharmD [facility]  Ambulatory Clinical Pharmacist  Specialty Medication Services  Phone: [phone]  Fax: [phone]    For Pharmacy Admin Tracking Only    Program: Ember  CPA in place:  Yes  Time Spent (min): 15

## 2024-08-30 ENCOUNTER — TELEPHONE (OUTPATIENT)
Dept: INTERNAL MEDICINE | Age: 64
End: 2024-08-30

## 2024-08-30 NOTE — TELEPHONE ENCOUNTER
Specialty Medication Service    Date: 8/30/2024  Patient's Name: Miguel Szymanski YOB: 1960            _____________________________________________________________________________________________    Email received from [facility] in regard to current SMS formulary medication, [employer].  [employer] secondary  SMS override placed.     Maday Patino CPhT  Pharmacy   Specialty Medication Services   Phone: [phone] option 4      For Pharmacy Admin Tracking Only    Program: SMS  CPA in place:  No  Recommendation Provided To: Pharmacy: 1  Intervention Detail: Benefit Assistance  Intervention Accepted By: Pharmacy: 1  Gap Closed?:    Time Spent (min): 15